# Patient Record
Sex: MALE | Race: WHITE | NOT HISPANIC OR LATINO | Employment: UNEMPLOYED | URBAN - METROPOLITAN AREA
[De-identification: names, ages, dates, MRNs, and addresses within clinical notes are randomized per-mention and may not be internally consistent; named-entity substitution may affect disease eponyms.]

---

## 2017-02-08 ENCOUNTER — HOSPITAL ENCOUNTER (EMERGENCY)
Facility: HOSPITAL | Age: 27
Discharge: LEFT BEFORE DISCHARGE | End: 2017-02-09
Attending: EMERGENCY MEDICINE | Admitting: EMERGENCY MEDICINE

## 2017-02-08 DIAGNOSIS — M54.5 ACUTE LOW BACK PAIN, UNSPECIFIED BACK PAIN LATERALITY, WITH SCIATICA PRESENCE UNSPECIFIED: Primary | ICD-10-CM

## 2017-02-08 DIAGNOSIS — N39.0 URINARY TRACT INFECTION, SITE UNSPECIFIED: ICD-10-CM

## 2017-02-08 DIAGNOSIS — R79.89 ELEVATED PROCALCITONIN: ICD-10-CM

## 2017-02-08 DIAGNOSIS — F19.91 HISTORY OF DRUG USE: ICD-10-CM

## 2017-02-08 LAB
D-LACTATE SERPL-SCNC: 1 MMOL/L (ref 0.5–2)
FLUAV AG NPH QL: NEGATIVE
FLUBV AG NPH QL IA: NEGATIVE

## 2017-02-08 PROCEDURE — 96365 THER/PROPH/DIAG IV INF INIT: CPT

## 2017-02-08 PROCEDURE — 83605 ASSAY OF LACTIC ACID: CPT | Performed by: PHYSICIAN ASSISTANT

## 2017-02-08 PROCEDURE — 84145 PROCALCITONIN (PCT): CPT | Performed by: PHYSICIAN ASSISTANT

## 2017-02-08 PROCEDURE — 25010000002 PIPERACILLIN SOD-TAZOBACTAM PER 1 G: Performed by: PHYSICIAN ASSISTANT

## 2017-02-08 PROCEDURE — 96375 TX/PRO/DX INJ NEW DRUG ADDON: CPT

## 2017-02-08 PROCEDURE — 87804 INFLUENZA ASSAY W/OPTIC: CPT | Performed by: PHYSICIAN ASSISTANT

## 2017-02-08 PROCEDURE — 80053 COMPREHEN METABOLIC PANEL: CPT | Performed by: PHYSICIAN ASSISTANT

## 2017-02-08 PROCEDURE — 96367 TX/PROPH/DG ADDL SEQ IV INF: CPT

## 2017-02-08 PROCEDURE — 87040 BLOOD CULTURE FOR BACTERIA: CPT | Performed by: PHYSICIAN ASSISTANT

## 2017-02-08 PROCEDURE — 96361 HYDRATE IV INFUSION ADD-ON: CPT

## 2017-02-08 PROCEDURE — 85025 COMPLETE CBC W/AUTO DIFF WBC: CPT | Performed by: PHYSICIAN ASSISTANT

## 2017-02-08 PROCEDURE — 25010000002 DIPHENHYDRAMINE PER 50 MG: Performed by: PHYSICIAN ASSISTANT

## 2017-02-08 PROCEDURE — 99283 EMERGENCY DEPT VISIT LOW MDM: CPT

## 2017-02-08 PROCEDURE — 25010000002 METOCLOPRAMIDE PER 10 MG: Performed by: PHYSICIAN ASSISTANT

## 2017-02-08 PROCEDURE — 25010000002 KETOROLAC TROMETHAMINE PER 15 MG: Performed by: PHYSICIAN ASSISTANT

## 2017-02-08 RX ORDER — ACETAMINOPHEN 500 MG
1000 TABLET ORAL ONCE
Status: COMPLETED | OUTPATIENT
Start: 2017-02-08 | End: 2017-02-08

## 2017-02-08 RX ORDER — KETOROLAC TROMETHAMINE 15 MG/ML
15 INJECTION, SOLUTION INTRAMUSCULAR; INTRAVENOUS ONCE
Status: COMPLETED | OUTPATIENT
Start: 2017-02-08 | End: 2017-02-08

## 2017-02-08 RX ORDER — VANCOMYCIN HYDROCHLORIDE 1 G/200ML
1000 INJECTION, SOLUTION INTRAVENOUS ONCE
Status: COMPLETED | OUTPATIENT
Start: 2017-02-08 | End: 2017-02-09

## 2017-02-08 RX ORDER — SODIUM CHLORIDE 0.9 % (FLUSH) 0.9 %
10 SYRINGE (ML) INJECTION AS NEEDED
Status: DISCONTINUED | OUTPATIENT
Start: 2017-02-08 | End: 2017-02-09 | Stop reason: HOSPADM

## 2017-02-08 RX ORDER — DIPHENHYDRAMINE HYDROCHLORIDE 50 MG/ML
25 INJECTION INTRAMUSCULAR; INTRAVENOUS ONCE
Status: COMPLETED | OUTPATIENT
Start: 2017-02-08 | End: 2017-02-08

## 2017-02-08 RX ORDER — METOCLOPRAMIDE HYDROCHLORIDE 5 MG/ML
10 INJECTION INTRAMUSCULAR; INTRAVENOUS ONCE
Status: COMPLETED | OUTPATIENT
Start: 2017-02-08 | End: 2017-02-08

## 2017-02-08 RX ORDER — ACETAMINOPHEN 325 MG/1
650 TABLET ORAL ONCE
Status: DISCONTINUED | OUTPATIENT
Start: 2017-02-08 | End: 2017-02-08

## 2017-02-08 RX ADMIN — SODIUM CHLORIDE 1905 ML: 9 INJECTION, SOLUTION INTRAVENOUS at 23:43

## 2017-02-08 RX ADMIN — ACETAMINOPHEN 1000 MG: 500 TABLET, FILM COATED ORAL at 23:45

## 2017-02-08 RX ADMIN — KETOROLAC TROMETHAMINE 15 MG: 15 INJECTION, SOLUTION INTRAMUSCULAR; INTRAVENOUS at 23:52

## 2017-02-08 RX ADMIN — DIPHENHYDRAMINE HYDROCHLORIDE 25 MG: 50 INJECTION INTRAMUSCULAR; INTRAVENOUS at 23:48

## 2017-02-08 RX ADMIN — TAZOBACTAM SODIUM AND PIPERACILLIN SODIUM 4.5 G: 500; 4 INJECTION, SOLUTION INTRAVENOUS at 23:55

## 2017-02-08 RX ADMIN — METOCLOPRAMIDE 10 MG: 5 INJECTION, SOLUTION INTRAMUSCULAR; INTRAVENOUS at 23:50

## 2017-02-09 ENCOUNTER — APPOINTMENT (OUTPATIENT)
Dept: MRI IMAGING | Facility: HOSPITAL | Age: 27
End: 2017-02-09

## 2017-02-09 ENCOUNTER — APPOINTMENT (OUTPATIENT)
Dept: CT IMAGING | Facility: HOSPITAL | Age: 27
End: 2017-02-09

## 2017-02-09 VITALS
SYSTOLIC BLOOD PRESSURE: 103 MMHG | DIASTOLIC BLOOD PRESSURE: 65 MMHG | RESPIRATION RATE: 23 BRPM | WEIGHT: 140 LBS | HEART RATE: 142 BPM | HEIGHT: 66 IN | TEMPERATURE: 98.5 F | BODY MASS INDEX: 22.5 KG/M2 | OXYGEN SATURATION: 96 %

## 2017-02-09 LAB
ALBUMIN SERPL-MCNC: 3.9 G/DL (ref 3.2–4.8)
ALBUMIN/GLOB SERPL: 1.3 G/DL (ref 1.5–2.5)
ALP SERPL-CCNC: 111 U/L (ref 25–100)
ALT SERPL W P-5'-P-CCNC: 56 U/L (ref 7–40)
AMPHET+METHAMPHET UR QL: NEGATIVE
AMPHETAMINES UR QL: NEGATIVE
ANION GAP SERPL CALCULATED.3IONS-SCNC: 8 MMOL/L (ref 3–11)
AST SERPL-CCNC: 66 U/L (ref 0–33)
BACTERIA UR QL AUTO: ABNORMAL /HPF
BARBITURATES UR QL SCN: NEGATIVE
BASOPHILS # BLD AUTO: 0.01 10*3/MM3 (ref 0–0.2)
BASOPHILS NFR BLD AUTO: 0.1 % (ref 0–1)
BENZODIAZ UR QL SCN: NEGATIVE
BILIRUB SERPL-MCNC: 0.7 MG/DL (ref 0.3–1.2)
BILIRUB UR QL STRIP: NEGATIVE
BUN BLD-MCNC: 10 MG/DL (ref 9–23)
BUN/CREAT SERPL: 10 (ref 7–25)
BUPRENORPHINE SERPL-MCNC: NEGATIVE NG/ML
CALCIUM SPEC-SCNC: 9.7 MG/DL (ref 8.7–10.4)
CANNABINOIDS SERPL QL: POSITIVE
CHLORIDE SERPL-SCNC: 99 MMOL/L (ref 99–109)
CLARITY UR: CLEAR
CO2 SERPL-SCNC: 30 MMOL/L (ref 20–31)
COCAINE UR QL: POSITIVE
COLOR UR: YELLOW
CREAT BLD-MCNC: 1 MG/DL (ref 0.6–1.3)
DEPRECATED RDW RBC AUTO: 41.9 FL (ref 37–54)
EOSINOPHIL # BLD AUTO: 0.02 10*3/MM3 (ref 0.1–0.3)
EOSINOPHIL NFR BLD AUTO: 0.2 % (ref 0–3)
ERYTHROCYTE [DISTWIDTH] IN BLOOD BY AUTOMATED COUNT: 13.5 % (ref 11.3–14.5)
GFR SERPL CREATININE-BSD FRML MDRD: 91 ML/MIN/1.73
GLOBULIN UR ELPH-MCNC: 3 GM/DL
GLUCOSE BLD-MCNC: 100 MG/DL (ref 70–100)
GLUCOSE UR STRIP-MCNC: NEGATIVE MG/DL
HCT VFR BLD AUTO: 39.2 % (ref 38.9–50.9)
HGB BLD-MCNC: 13.3 G/DL (ref 13.1–17.5)
HGB UR QL STRIP.AUTO: NEGATIVE
HYALINE CASTS UR QL AUTO: ABNORMAL /LPF
IMM GRANULOCYTES # BLD: 0.05 10*3/MM3 (ref 0–0.03)
IMM GRANULOCYTES NFR BLD: 0.5 % (ref 0–0.6)
KETONES UR QL STRIP: NEGATIVE
LEUKOCYTE ESTERASE UR QL STRIP.AUTO: ABNORMAL
LYMPHOCYTES # BLD AUTO: 0.43 10*3/MM3 (ref 0.6–4.8)
LYMPHOCYTES NFR BLD AUTO: 3.9 % (ref 24–44)
MCH RBC QN AUTO: 29 PG (ref 27–31)
MCHC RBC AUTO-ENTMCNC: 33.9 G/DL (ref 32–36)
MCV RBC AUTO: 85.6 FL (ref 80–99)
METHADONE UR QL SCN: NEGATIVE
MONOCYTES # BLD AUTO: 0.11 10*3/MM3 (ref 0–1)
MONOCYTES NFR BLD AUTO: 1 % (ref 0–12)
NEUTROPHILS # BLD AUTO: 10.46 10*3/MM3 (ref 1.5–8.3)
NEUTROPHILS NFR BLD AUTO: 94.3 % (ref 41–71)
NITRITE UR QL STRIP: NEGATIVE
OPIATES UR QL: POSITIVE
OXYCODONE UR QL SCN: POSITIVE
PCP UR QL SCN: NEGATIVE
PH UR STRIP.AUTO: 6.5 [PH] (ref 5–8)
PLATELET # BLD AUTO: 169 10*3/MM3 (ref 150–450)
PMV BLD AUTO: 8.9 FL (ref 6–12)
POTASSIUM BLD-SCNC: 3.9 MMOL/L (ref 3.5–5.5)
PROCALCITONIN SERPL-MCNC: 19.74 NG/ML
PROPOXYPH UR QL: NEGATIVE
PROT SERPL-MCNC: 6.9 G/DL (ref 5.7–8.2)
PROT UR QL STRIP: NEGATIVE
RBC # BLD AUTO: 4.58 10*6/MM3 (ref 4.2–5.76)
RBC # UR: ABNORMAL /HPF
REF LAB TEST METHOD: ABNORMAL
SODIUM BLD-SCNC: 137 MMOL/L (ref 132–146)
SP GR UR STRIP: 1.01 (ref 1–1.03)
SQUAMOUS #/AREA URNS HPF: ABNORMAL /HPF
TRICYCLICS UR QL SCN: NEGATIVE
UROBILINOGEN UR QL STRIP: ABNORMAL
WBC NRBC COR # BLD: 11.08 10*3/MM3 (ref 3.5–10.8)
WBC UR QL AUTO: ABNORMAL /HPF

## 2017-02-09 PROCEDURE — 80306 DRUG TEST PRSMV INSTRMNT: CPT | Performed by: PHYSICIAN ASSISTANT

## 2017-02-09 PROCEDURE — 72148 MRI LUMBAR SPINE W/O DYE: CPT

## 2017-02-09 PROCEDURE — 87086 URINE CULTURE/COLONY COUNT: CPT | Performed by: PHYSICIAN ASSISTANT

## 2017-02-09 PROCEDURE — 70450 CT HEAD/BRAIN W/O DYE: CPT

## 2017-02-09 PROCEDURE — 25010000002 VANCOMYCIN PER 500 MG: Performed by: PHYSICIAN ASSISTANT

## 2017-02-09 PROCEDURE — 81001 URINALYSIS AUTO W/SCOPE: CPT | Performed by: PHYSICIAN ASSISTANT

## 2017-02-09 RX ORDER — CEFDINIR 300 MG/1
300 CAPSULE ORAL 2 TIMES DAILY
Qty: 14 CAPSULE | Refills: 0 | OUTPATIENT
Start: 2017-02-09 | End: 2020-08-17

## 2017-02-09 RX ADMIN — VANCOMYCIN HYDROCHLORIDE 1000 MG: 1 INJECTION, SOLUTION INTRAVENOUS at 01:06

## 2017-02-09 NOTE — DISCHARGE INSTRUCTIONS
Increase fluid intake.  Rest.  Follow-up with one of the providers listed below to establish a primary care provider and recheck within 1-2 days.  Return immediately if any change or worsening.    Follow up with one of the [facility] physicians below to setup primary care.    (Dr. Scherer, Dr. Jones, Dr. Ann, and Dr. Valdez.)  [facility], Primary Care, [phone], [address], [place] [address]    [facility], Primary Care, [phone], [address], [address] [place], [address] [facility], Primary Care, [phone], [address], [address] [place], [address] [facility], Primary Care, [phone], [address], [address] [place], [address]     [place] [facility], Primary Care, [phone], [address], [address] [place], [address]    [place] [facility], Primary Care, [phone], [address]    [place], [address] [facility], Primary Care, [phone], [address], [address] [place], [address] [facility], Primary Care, [phone], [address], [address] [place], [address] [facility]) [facility], Primary Care, [phone], [address], [address] [place], [address] [facility], Primary Care, [phone], [address], [address] [place], [address] [facility] Medical [facility], Primary Care, [phone], [address], [place], [address]     [facility], Primary Care, [phone], [address] [facility], [address], [address]

## 2017-02-09 NOTE — ED PROVIDER NOTES
Subjective   Patient is a 26 y.o. male presenting with back pain.   Back Pain   Location:  Thoracic spine and lumbar spine  Quality:  Stabbing and aching  Radiates to:  Does not radiate  Pain severity:  Severe  Pain is:  Same all the time  Onset quality:  Sudden  Duration:  8 hours  Timing:  Constant  Progression:  Unchanged  Context: not emotional stress, not falling, not jumping from heights, not lifting heavy objects, not MCA, not MVA, not occupational injury, not pedestrian accident, not physical stress, not recent illness, not recent injury and not twisting    Context comment:  History of IV drug use as recently as one month ago  Relieved by:  Nothing  Worsened by:  Nothing  Ineffective treatments:  None tried  Associated symptoms: fever and headaches    Associated symptoms: no abdominal pain, no abdominal swelling, no bladder incontinence, no bowel incontinence, no chest pain, no dysuria, no leg pain, no paresthesias, no pelvic pain, no perianal numbness, no tingling, no weakness and no weight loss    Risk factors comment:  History of IV drug use    26-year-old male presents to emergency department complaining of severe mid and low back pain that started this afternoon, awakening patient from sleep.  Associated with severe sharp sudden onset right parietal headache, mild photophobia.  No stiff neck.  No unilateral weakness paresis paresthesias saddle anesthesia bowel or bladder dysfunction.  No abdominal pain bloating or distention.  No cough chest congestion sputum or hemoptysis.  No prior history of thromboembolic disease.  He does have a history of IV drug use, as recently as one month ago.  No prior history of valvular disease or endocarditis.  Review of Systems   Constitutional: Positive for fever. Negative for weight loss.   Cardiovascular: Negative for chest pain.   Gastrointestinal: Negative for abdominal pain and bowel incontinence.   Genitourinary: Negative for bladder incontinence, dysuria and  pelvic pain.   Musculoskeletal: Positive for back pain.   Neurological: Positive for headaches. Negative for tingling, weakness and paresthesias.   All other systems reviewed and are negative.      History reviewed. No pertinent past medical history.    No Known Allergies    History reviewed. No pertinent past surgical history.    History reviewed. No pertinent family history.    Social History     Social History   • Marital status: Single     Spouse name: N/A   • Number of children: N/A   • Years of education: N/A     Social History Main Topics   • Smoking status: Current Every Day Smoker     Packs/day: 0.50     Types: Cigarettes   • Smokeless tobacco: None   • Alcohol use No   • Drug use: No      Comment: WAS USING HEROINE A MONTH AGO AS OF 2/8/2017   • Sexual activity: Not Asked     Other Topics Concern   • None     Social History Narrative   • None           Objective   Physical Exam   Constitutional: He is oriented to person, place, and time. He appears well-developed and well-nourished. No distress.   Alert oriented, does not appear to be toxic, appears in moderate amount discomfort, oral temp 99.9 heart rate 142, respirations 23 clear nonlabor blood pressure 132% 75   HENT:   Head: Normocephalic and atraumatic.   Right Ear: External ear normal.   Left Ear: External ear normal.   Nose: Nose normal.   Mouth/Throat: Oropharynx is clear and moist. No oropharyngeal exudate.   Eyes: Conjunctivae and EOM are normal. Pupils are equal, round, and reactive to light. Right eye exhibits no discharge. Left eye exhibits no discharge. No scleral icterus.   Neck: Normal range of motion. Neck supple. No JVD present. No tracheal deviation present. No thyromegaly present.   No meningismus   Cardiovascular: Normal rate, regular rhythm, normal heart sounds and intact distal pulses.  Exam reveals no gallop and no friction rub.    No murmur heard.  Pulmonary/Chest: Effort normal and breath sounds normal. No stridor. No respiratory  distress. He has no wheezes. He has no rales. He exhibits no tenderness.   Abdominal: Soft. Bowel sounds are normal. He exhibits no distension and no mass. There is no tenderness. There is no rebound and no guarding. No hernia.   Musculoskeletal: Normal range of motion. He exhibits no edema, tenderness or deformity.   Midline tenderness in the lower thoracic upper lumbar area, no external sign of injury.  No erythema or rash.   Lymphadenopathy:     He has no cervical adenopathy.   Neurological: He is alert and oriented to person, place, and time. He has normal reflexes. He displays normal reflexes. No cranial nerve deficit. He exhibits normal muscle tone. Coordination normal.   Straight leg raises are negative DTRs are 2+ over 4+ bilaterally in the lower extremities.   Skin: Skin is dry. No rash noted. He is not diaphoretic. No erythema. No pallor.   Skin is hot and dry no rash.  Fair turgor   Psychiatric: He has a normal mood and affect. His behavior is normal. Judgment and thought content normal.   Nursing note and vitals reviewed.      Procedures        Recent Results (from the past 24 hour(s))   Comprehensive Metabolic Panel    Collection Time: 02/08/17 11:18 PM   Result Value Ref Range    Glucose 100 70 - 100 mg/dL    BUN 10 9 - 23 mg/dL    Creatinine 1.00 0.60 - 1.30 mg/dL    Sodium 137 132 - 146 mmol/L    Potassium 3.9 3.5 - 5.5 mmol/L    Chloride 99 99 - 109 mmol/L    CO2 30.0 20.0 - 31.0 mmol/L    Calcium 9.7 8.7 - 10.4 mg/dL    Total Protein 6.9 5.7 - 8.2 g/dL    Albumin 3.90 3.20 - 4.80 g/dL    ALT (SGPT) 56 (H) 7 - 40 U/L    AST (SGOT) 66 (H) 0 - 33 U/L    Alkaline Phosphatase 111 (H) 25 - 100 U/L    Total Bilirubin 0.7 0.3 - 1.2 mg/dL    eGFR Non African Amer 91 >60 mL/min/1.73    Globulin 3.0 gm/dL    A/G Ratio 1.3 (L) 1.5 - 2.5 g/dL    BUN/Creatinine Ratio 10.0 7.0 - 25.0    Anion Gap 8.0 3.0 - 11.0 mmol/L   Procalcitonin    Collection Time: 02/08/17 11:18 PM   Result Value Ref Range    Procalcitonin  19.74 ng/mL   Lactic Acid, Plasma    Collection Time: 02/08/17 11:18 PM   Result Value Ref Range    Lactate 1.0 0.5 - 2.0 mmol/L   CBC Auto Differential    Collection Time: 02/08/17 11:18 PM   Result Value Ref Range    WBC 11.08 (H) 3.50 - 10.80 10*3/mm3    RBC 4.58 4.20 - 5.76 10*6/mm3    Hemoglobin 13.3 13.1 - 17.5 g/dL    Hematocrit 39.2 38.9 - 50.9 %    MCV 85.6 80.0 - 99.0 fL    MCH 29.0 27.0 - 31.0 pg    MCHC 33.9 32.0 - 36.0 g/dL    RDW 13.5 11.3 - 14.5 %    RDW-SD 41.9 37.0 - 54.0 fl    MPV 8.9 6.0 - 12.0 fL    Platelets 169 150 - 450 10*3/mm3    Neutrophil % 94.3 (H) 41.0 - 71.0 %    Lymphocyte % 3.9 (L) 24.0 - 44.0 %    Monocyte % 1.0 0.0 - 12.0 %    Eosinophil % 0.2 0.0 - 3.0 %    Basophil % 0.1 0.0 - 1.0 %    Immature Grans % 0.5 0.0 - 0.6 %    Neutrophils, Absolute 10.46 (H) 1.50 - 8.30 10*3/mm3    Lymphocytes, Absolute 0.43 (L) 0.60 - 4.80 10*3/mm3    Monocytes, Absolute 0.11 0.00 - 1.00 10*3/mm3    Eosinophils, Absolute 0.02 (L) 0.10 - 0.30 10*3/mm3    Basophils, Absolute 0.01 0.00 - 0.20 10*3/mm3    Immature Grans, Absolute 0.05 (H) 0.00 - 0.03 10*3/mm3   Influenza Antigen    Collection Time: 02/08/17 11:21 PM   Result Value Ref Range    Influenza A Ag, EIA Negative Negative    Influenza B Ag, EIA Negative Negative   Urinalysis With / Culture If Indicated    Collection Time: 02/09/17  1:06 AM   Result Value Ref Range    Color, UA Yellow Yellow, Straw    Appearance, UA Clear Clear    pH, UA 6.5 5.0 - 8.0    Specific Gravity, UA 1.011 1.001 - 1.030    Glucose, UA Negative Negative    Ketones, UA Negative Negative    Bilirubin, UA Negative Negative    Blood, UA Negative Negative    Protein, UA Negative Negative    Leuk Esterase, UA Small (1+) (A) Negative    Nitrite, UA Negative Negative    Urobilinogen, UA 1.0 E.U./dL 0.2 - 1.0 E.U./dL   Urine Drug Screen    Collection Time: 02/09/17  1:06 AM   Result Value Ref Range    THC, Screen, Urine Positive (A) Negative    Phencyclidine (PCP), Urine Negative  Negative    Cocaine Screen, Urine Positive (A) Negative    Methamphetamine, Urine Negative Negative    Opiate Screen Positive (A) Negative    Amphetamine Screen, Urine Negative Negative    Benzodiazepine Screen, Urine Negative Negative    Tricyclic Antidepressants Screen Negative Negative    Methadone Screen, Urine Negative Negative    Barbiturates Screen, Urine Negative Negative    Oxycodone Screen, Urine Positive (A) Negative    Propoxyphene Screen Negative Negative    Buprenorphine, Screen, Urine Negative Negative   Urinalysis, Microscopic Only    Collection Time: 02/09/17  1:06 AM   Result Value Ref Range    RBC, UA 0-2 None Seen, 0-2 /HPF    WBC, UA 13-20 (A) None Seen /HPF    Bacteria, UA None Seen None Seen, Trace /HPF    Squamous Epithelial Cells, UA 0-2 None Seen, 0-2 /HPF    Hyaline Casts, UA 0-6 0 - 6 /LPF    Methodology Automated Microscopy      Note: In addition to lab results from this visit, the labs listed above may include labs taken at another facility or during a different encounter within the last 24 hours. Please correlate lab times with ED admission and discharge times for further clarification of the services performed during this visit.    MRI Lumbar Spine Without Contrast   Final Result   Abnormal      Limited MRI composed of only sagittal T2 and STIR imaging. However, given the    limitation, the study appears grossly unremarkable. No obvious epidural or    paraspinal abnormalities. No evidence to suggest discitis/osteomyelitis.      THIS DOCUMENT HAS BEEN ELECTRONICALLY SIGNED BY EDYTA RAND MD      CT Head Without Contrast   ED Interpretation   1. No acute intracranial abnormality identified.      THIS DOCUMENT HAS BEEN ELECTRONICALLY SIGNED BY DAVID METZGER MD      Final Result   Abnormal   1. No acute intracranial abnormality identified.      THIS DOCUMENT HAS BEEN ELECTRONICALLY SIGNED BY DAVID METZGER MD      MRI Thoracic Spine Without Contrast    (Results Pending)     Vitals:     "02/08/17 2113 02/09/17 0109 02/09/17 0130 02/09/17 0200   BP: 132/75 111/60 100/59 103/65   BP Location: Left arm      Patient Position: Sitting      Pulse: (!) 142      Resp: 23      Temp: 97.9 °F (36.6 °C)      TempSrc: Oral      SpO2: 96% 97% 96% 96%   Weight: 140 lb (63.5 kg)      Height: 66\" (167.6 cm)        Medications   sodium chloride 0.9 % flush 10 mL (not administered)   vancomycin (VANCOCIN) IVPB 1 g (premix) in Dextrose 5% 200 mL (0 mg Intravenous Stopped 2/9/17 0210)   piperacillin-tazobactam (ZOSYN) 4.5 g in dextrose 100 mL IVPB (premix) (0 g Intravenous Stopped 2/9/17 0027)   sodium chloride 0.9 % bolus 1,905 mL (0 mL Intravenous Stopped 2/9/17 0145)   ketorolac (TORADOL) injection 15 mg (15 mg Intravenous Given 2/8/17 2352)   metoclopramide (REGLAN) injection 10 mg (10 mg Intravenous Given 2/8/17 2350)   diphenhydrAMINE (BENADRYL) injection 25 mg (25 mg Intravenous Given 2/8/17 2348)   acetaminophen (TYLENOL) tablet 1,000 mg (1,000 mg Oral Given 2/8/17 2345)     ECG/EMG Results (last 24 hours)     ** No results found for the last 24 hours. **            ED Course  ED Course   Value Comment By Time   Procalcitonin: 19.74 (Reviewed) Herb Alvarez PA-C 02/09 0029    At 00 48 hours, was called to the MRI department, as patient had refused his MRI.  Upon arriving in the department, patient states \"I'm sorry I just can't go back in there\".  I explained that I can give him medication to reduce his anxiety and/or sedate him for the procedure, he refused.  I stated he may potentially have a life-threatening medical condition or condition that could render him paralyzed, he voiced understanding and stated \"I a understands position your in, but I just can't go back in there\".  Paul, MRI technician was present during this exchange. Herb Alvarez PA-C 02/09 0056    UA WBC 13-20.  UDS noted.  He will be given a prescription for Omnicef to take twice daily for 7 days, but will be leaving AGAINST " MEDICAL ADVICE.  He was again offered the opportunity to sedate for further evaluation of his symptoms.  MRI, however he still refused.  Patient understands that failure to fully evaluate his condition could result in permanent impairment and/or death, patient verbalizes understanding. Herb Alvarez PA-C 02/09 0240                  [facility]    Final diagnoses:   Acute low back pain, unspecified back pain laterality, with sciatica presence unspecified   Elevated procalcitonin   History of drug use   Urinary tract infection, site unspecified            Herb Alvarez PA-C  02/09/17 0240

## 2017-02-11 LAB — BACTERIA SPEC AEROBE CULT: NORMAL

## 2017-02-14 LAB
BACTERIA SPEC AEROBE CULT: NORMAL
BACTERIA SPEC AEROBE CULT: NORMAL

## 2017-10-02 ENCOUNTER — HOSPITAL ENCOUNTER (EMERGENCY)
Facility: HOSPITAL | Age: 27
Discharge: LEFT BEFORE DISCHARGE | End: 2017-10-02

## 2017-10-02 VITALS
TEMPERATURE: 98.5 F | HEART RATE: 51 BPM | SYSTOLIC BLOOD PRESSURE: 128 MMHG | WEIGHT: 155 LBS | BODY MASS INDEX: 24.33 KG/M2 | DIASTOLIC BLOOD PRESSURE: 76 MMHG | HEIGHT: 67 IN | RESPIRATION RATE: 16 BRPM | OXYGEN SATURATION: 99 %

## 2017-10-02 LAB
AMPHET+METHAMPHET UR QL: NEGATIVE
AMPHETAMINES UR QL: NEGATIVE
BARBITURATES UR QL SCN: NEGATIVE
BENZODIAZ UR QL SCN: POSITIVE
BUPRENORPHINE SERPL-MCNC: NEGATIVE NG/ML
CANNABINOIDS SERPL QL: POSITIVE
COCAINE UR QL: POSITIVE
ETHANOL BLD-MCNC: <10 MG/DL (ref 0–10)
METHADONE UR QL SCN: NEGATIVE
OPIATES UR QL: POSITIVE
OXYCODONE UR QL SCN: NEGATIVE
PCP UR QL SCN: NEGATIVE
PROPOXYPH UR QL: NEGATIVE
TRICYCLICS UR QL SCN: NEGATIVE

## 2017-10-02 PROCEDURE — 80306 DRUG TEST PRSMV INSTRMNT: CPT

## 2017-10-02 PROCEDURE — 99211 OFF/OP EST MAY X REQ PHY/QHP: CPT

## 2017-10-02 PROCEDURE — 80307 DRUG TEST PRSMV CHEM ANLYZR: CPT

## 2020-08-17 PROCEDURE — U0003 INFECTIOUS AGENT DETECTION BY NUCLEIC ACID (DNA OR RNA); SEVERE ACUTE RESPIRATORY SYNDROME CORONAVIRUS 2 (SARS-COV-2) (CORONAVIRUS DISEASE [COVID-19]), AMPLIFIED PROBE TECHNIQUE, MAKING USE OF HIGH THROUGHPUT TECHNOLOGIES AS DESCRIBED BY CMS-2020-01-R: HCPCS | Performed by: NURSE PRACTITIONER

## 2020-08-20 ENCOUNTER — TELEPHONE (OUTPATIENT)
Dept: URGENT CARE | Facility: CLINIC | Age: 30
End: 2020-08-20

## 2020-08-20 NOTE — TELEPHONE ENCOUNTER
----- Message from Aj Miller MD sent at 8/20/2020  8:19 AM EDT -----  COVID is not detected. Recommend current (10day/24hr) protocol.  Please notify the patient.-Aj Miller M.D.      ----- Message -----  From: Lab, Background User  Sent: 8/20/2020  12:07 AM EDT  To: [facility] Covid Results    Letter sent to pt with results. Attempted to call pt and call c/n be completed.